# Patient Record
(demographics unavailable — no encounter records)

---

## 2024-12-31 NOTE — HISTORY OF PRESENT ILLNESS
[FreeTextEntry1] : The above the patient presents to the office a chief complaint of deformity of the left great toenail and mild erythema to the cuticle just proximal to the nail dystrophy She states she is unsure of the history of present illness however she states it has been going on "for quite some time"

## 2024-12-31 NOTE — PHYSICAL EXAM
[General Appearance - Alert] : alert [General Appearance - In No Acute Distress] : in no acute distress [No Joint Swelling] : no joint swelling [Normal Foot/Ankle] : Both lower extremities were exposed and visualized. Standing exam demonstrates normal foot posture and alignment. Hindfoot exam shows no hindfoot valgus or varus [Skin Color & Pigmentation] : normal skin color and pigmentation [Skin Turgor] : normal skin turgor [] : no rash [Skin Lesions] : no skin lesions [Sensation] : the sensory exam was normal to light touch and pinprick [No Focal Deficits] : no focal deficits [Deep Tendon Reflexes (DTR)] : deep tendon reflexes were 2+ and symmetric [Motor Exam] : the motor exam was normal [Oriented To Time, Place, And Person] : oriented to person, place, and time [Impaired Insight] : insight and judgment were intact [Affect] : the affect was normal [FreeTextEntry3] : Vascular exam reveals palpable pedal pulses, the foot is warm to touch, there was good capillary fill time, the skin is normal in appearance there is no evidence of vascular disease or compromise at this time [de-identified] : s/p bilateral 1st MTPJ fusions but I feel this is non contributory. There is mild medial deviation of 2nd toe and the redness could be pressure?? [Foot Ulcer] : no foot ulcer [Skin Induration] : no skin induration

## 2024-12-31 NOTE — PROCEDURE
[FreeTextEntry1] : Based on my physical examination and my clinical findings and the patient's description of the symptoms, a complete differential diagnosis was reviewed with the patient. Possible diagnoses as well as treatment options explained in great detail. All questions asked and answered appropriately. I have had a lengthy discussion with the patient regarding overall skincare. The importance of the type of socks, the type of shoes, and the type of overall foot hygiene is important to help control and prevent eruptions especially over extreme weather changes. This included but was not limited to hydration and lubrication, dove soap, triple rinse clothing and linens. I also explained the importance of thorough drying of both feet especially the web spaces. Given the extreme temperatures back in a car I also reviewed the type of shoes that would help reduce the chances of cracking of the skin especially leading to fissuring of the heels. Overall skincare precautions were reviewed education literature dispensed in the patient's questions asked and answered appropriately. The patient has all contributing factors to onychomycosis including but not limited to thickness, subungual debris, discoloration and partial lysis and they are brittle when cut. I advised to monitor the nail dystrophy and report back should there be any significant change